# Patient Record
Sex: MALE | Race: ASIAN | URBAN - METROPOLITAN AREA
[De-identification: names, ages, dates, MRNs, and addresses within clinical notes are randomized per-mention and may not be internally consistent; named-entity substitution may affect disease eponyms.]

---

## 2017-04-10 ENCOUNTER — OFFICE VISIT (OUTPATIENT)
Dept: URGENT CARE | Facility: URGENT CARE | Age: 32
End: 2017-04-10
Payer: COMMERCIAL

## 2017-04-10 VITALS
WEIGHT: 149.4 LBS | BODY MASS INDEX: 24.48 KG/M2 | OXYGEN SATURATION: 95 % | SYSTOLIC BLOOD PRESSURE: 146 MMHG | HEART RATE: 93 BPM | DIASTOLIC BLOOD PRESSURE: 88 MMHG

## 2017-04-10 DIAGNOSIS — T78.40XA ALLERGIC REACTION, INITIAL ENCOUNTER: Primary | ICD-10-CM

## 2017-04-10 PROCEDURE — 99213 OFFICE O/P EST LOW 20 MIN: CPT | Performed by: PHYSICIAN ASSISTANT

## 2017-04-10 RX ORDER — BENZOCAINE/MENTHOL 6 MG-10 MG
LOZENGE MUCOUS MEMBRANE
Qty: 30 G | Refills: 0 | Status: SHIPPED | OUTPATIENT
Start: 2017-04-10

## 2017-04-10 RX ORDER — DIPHENHYDRAMINE HCL 25 MG
25-50 TABLET ORAL EVERY 6 HOURS PRN
Qty: 60 TABLET | Refills: 1 | Status: SHIPPED | OUTPATIENT
Start: 2017-04-10

## 2017-04-10 RX ORDER — PREDNISONE 20 MG/1
20 TABLET ORAL 2 TIMES DAILY
Qty: 10 TABLET | Refills: 0 | Status: SHIPPED | OUTPATIENT
Start: 2017-04-10

## 2017-04-10 ASSESSMENT — PAIN SCALES - GENERAL: PAINLEVEL: NO PAIN (0)

## 2017-04-10 NOTE — PROGRESS NOTES
"Nursing Notes:   Denice Rueda RN  4/10/2017 12:39 PM  Signed  Chief Complaint   Patient presents with     Allergic Reaction     Patient had crawfish 2 days ago at night.  Patient denies any breathing problem, edmea.  Patient states that redness started 2 days ago and is worsening.  Faces itches at night.   Patient tried Aquafor cream no refill.  Patient has not taken any benadryl or zyrtec.        Initial BP (!) 157/94 (BP Location: Right arm, Patient Position: Chair, Cuff Size: Adult Regular)  Pulse 108  Wt 149 lb 6.4 oz (67.8 kg)  SpO2 96%  BMI 24.48 kg/m2 Estimated body mass index is 24.48 kg/(m^2) as calculated from the following:    Height as of 4/4/16: 5' 5.5\" (1.664 m).    Weight as of this encounter: 149 lb 6.4 oz (67.8 kg).  Medication Reconciliation: unable or not appropriate to perform    Denice Rueda RN    Second /88       No Known Allergies    Past Medical History:   Diagnosis Date     Acne          Current Outpatient Prescriptions on File Prior to Visit:  hydrocortisone (CORTAID) 1 % cream Apply sparingly to affected area three times daily for 14 days.   multivitamin, therapeutic with minerals (THERA-VIT-M) TABS Take 1 tablet by mouth daily     No current facility-administered medications on file prior to visit.     Social History   Substance Use Topics     Smoking status: Never Smoker     Smokeless tobacco: Not on file     Alcohol use 0.0 oz/week     0 Standard drinks or equivalent per week      Comment: socially       ROS:  General: negative for fever  SKIN: + as above      Physcial Exam:  /88 (BP Location: Right arm, Patient Position: Chair, Cuff Size: Adult Regular)  Pulse 93  Wt 149 lb 6.4 oz (67.8 kg)  SpO2 95%  BMI 24.48 kg/m2    GENERAL: alert, no acute distress  EYES: conjunctival clear  RESP: Regular breathing rate  NEURO: awake .  SKIN: Face with red blotches on forehead and cheeks. Some mild welts.  Mouth- no tongue or pharyngeal swelling.  Lungs- " CTA  ASSESSMENT:    ICD-10-CM    1. Allergic reaction, initial encounter T78.40XA predniSONE (DELTASONE) 20 MG tablet     diphenhydrAMINE (BENADRYL) 25 MG tablet     hydrocortisone (CORTAID) 1 % cream       PLAN:  See today's orders.  Follow-up with primary clinic if not improving.  Advised about symptoms which might herald more serious problems.    Soheila Lnadry PA-C

## 2017-04-10 NOTE — NURSING NOTE
"Chief Complaint   Patient presents with     Allergic Reaction     Patient had crawfish 2 days ago at night.  Patient denies any breathing problem, edmea.  Patient states that redness started 2 days ago and is worsening.  Faces itches at night.   Patient tried Aquafor cream no refill.  Patient has not taken any benadryl or zyrtec.        Initial BP (!) 157/94 (BP Location: Right arm, Patient Position: Chair, Cuff Size: Adult Regular)  Pulse 108  Wt 149 lb 6.4 oz (67.8 kg)  SpO2 96%  BMI 24.48 kg/m2 Estimated body mass index is 24.48 kg/(m^2) as calculated from the following:    Height as of 4/4/16: 5' 5.5\" (1.664 m).    Weight as of this encounter: 149 lb 6.4 oz (67.8 kg).  Medication Reconciliation: unable or not appropriate to perform    Denice Rueda RN    "

## 2017-04-10 NOTE — MR AVS SNAPSHOT
"              After Visit Summary   4/10/2017    Yuan Loera    MRN: [id number]           Patient Information     Date Of Birth          1985        Visit Information        Provider Department      4/10/2017 12:25 PM Soheila Landry PA-C [facility]        Today's Diagnoses     Allergic reaction, initial encounter    -  1       Follow-ups after your visit        Who to contact     If you have questions or need follow up information about today's clinic visit or your schedule please contact [facility] directly at [phone].  Normal or non-critical lab and imaging results will be communicated to you by Raynforest[employer], letter or phone within 4 business days after the clinic has received the results. If you do not hear from us within 7 days, please contact the clinic through Raynforest[employer] or phone. If you have a critical or abnormal lab result, we will notify you by phone as soon as possible.  Submit refill requests through [email or web address] or call your pharmacy and they will forward the refill request to us. Please allow 3 business days for your refill to be completed.          Additional Information About Your Visit        MyChart Information     [email or web address] lets you send messages to your doctor, view your test results, renew your prescriptions, schedule appointments and more. To sign up, go to [email or web address] . Click on \"Log in\" on the left side of the screen, which will take you to the Welcome page. Then click on \"Sign up Now\" on the right side of the page.     You will be asked to enter the access code listed below, as well as some personal information. Please follow the directions to create your username and password.     Your access code is: [email or web address]  Expires: 2017 12:43 PM     Your access code will  in 90 days. If you need help or a new code, please call your [facility] or [phone].        Care EveryWhere ID     This is your Care EveryWhere ID. This could be " used by other organizations to access your [facility] medical records  [id number]        Your Vitals Were     Pulse Pulse Oximetry BMI (Body Mass Index)             93 95% 24.48 kg/m2          Blood Pressure from Last 3 Encounters:   04/10/17 146/88   10/28/16 122/88   04/21/16 (!) 144/91    Weight from Last 3 Encounters:   04/10/17 149 lb 6.4 oz (67.8 kg)   10/28/16 152 lb 4.8 oz (69.1 kg)   04/21/16 150 lb 2 oz (68.1 kg)              Today, you had the following     No orders found for display         Today's Medication Changes          These changes are accurate as of: 4/10/17 12:43 PM.  If you have any questions, ask your nurse or doctor.               Start taking these medicines.        Dose/Directions    diphenhydrAMINE 25 MG tablet   Commonly known as:  BENADRYL   Used for:  Allergic reaction, initial encounter        Dose:  25-50 mg   Take 1-2 tablets (25-50 mg) by mouth every 6 hours as needed for itching or allergies   Quantity:  60 tablet   Refills:  1       predniSONE 20 MG tablet   Commonly known as:  DELTASONE   Used for:  Allergic reaction, initial encounter        Dose:  20 mg   Take 1 tablet (20 mg) by mouth 2 times daily   Quantity:  10 tablet   Refills:  0         These medicines have changed or have updated prescriptions.        Dose/Directions    * hydrocortisone 1 % cream   Commonly known as:  CORTAID   This may have changed:  Another medication with the same name was added. Make sure you understand how and when to take each.   Used for:  Allergic dermatitis        Apply sparingly to affected area three times daily for 14 days.   Quantity:  30 g   Refills:  0       * hydrocortisone 1 % cream   Commonly known as:  CORTAID   This may have changed:  You were already taking a medication with the same name, and this prescription was added. Make sure you understand how and when to take each.   Used for:  Allergic reaction, initial encounter        Apply sparingly to affected area three times daily  for 14 days.   Quantity:  30 g   Refills:  0       * Notice:  This list has 2 medication(s) that are the same as other medications prescribed for you. Read the directions carefully, and ask your doctor or other care provider to review them with you.         Where to get your medicines      These medications were sent [place] [facility] [place] - [address]  [address], [facility] [address]     Phone:  [phone]     diphenhydrAMINE 25 MG tablet    hydrocortisone 1 % cream    predniSONE 20 MG tablet                Primary Care Provider Office Phone # Fax #    Sayda Rebolledo [phone]       [facility] [address]  Health system [address]        Thank you!     Thank you for choosing [facility]  for your care. Our goal is always to provide you with excellent care. Hearing back from our patients is one way we can continue to improve our services. Please take a few minutes to complete the written survey that you may receive in the mail after your visit with us. Thank you!             Your Updated Medication List - Protect others around you: Learn how to safely use, store and throw away your medicines at www.disposemymeds.org.          This list is accurate as of: 4/10/17 12:43 PM.  Always use your most recent med list.                   Brand Name Dispense Instructions for use    diphenhydrAMINE 25 MG tablet    BENADRYL    60 tablet    Take 1-2 tablets (25-50 mg) by mouth every 6 hours as needed for itching or allergies       * hydrocortisone 1 % cream    CORTAID    30 g    Apply sparingly to affected area three times daily for 14 days.       * hydrocortisone 1 % cream    CORTAID    30 g    Apply sparingly to affected area three times daily for 14 days.       multivitamin, therapeutic with minerals Tabs tablet      Take 1 tablet by mouth daily       predniSONE 20 MG tablet    DELTASONE    10 tablet     Take 1 tablet (20 mg) by mouth 2 times daily       * Notice:  This list has 2 medication(s) that are the same as other medications prescribed for you. Read the directions carefully, and ask your doctor or other care provider to review them with you.